# Patient Record
Sex: FEMALE | URBAN - METROPOLITAN AREA
[De-identification: names, ages, dates, MRNs, and addresses within clinical notes are randomized per-mention and may not be internally consistent; named-entity substitution may affect disease eponyms.]

---

## 2020-07-22 ENCOUNTER — APPOINTMENT (OUTPATIENT)
Age: 60
Setting detail: DERMATOLOGY
End: 2020-07-22

## 2020-07-22 DIAGNOSIS — L82.1 OTHER SEBORRHEIC KERATOSIS: ICD-10-CM

## 2020-07-22 DIAGNOSIS — D22 MELANOCYTIC NEVI: ICD-10-CM

## 2020-07-22 DIAGNOSIS — L81.4 OTHER MELANIN HYPERPIGMENTATION: ICD-10-CM

## 2020-07-22 PROBLEM — D22.9 MELANOCYTIC NEVI, UNSPECIFIED: Status: ACTIVE | Noted: 2020-07-22

## 2020-07-22 PROCEDURE — ? LIQUID NITROGEN

## 2020-07-22 PROCEDURE — ? COUNSELING

## 2020-07-22 PROCEDURE — ? PHOTO-DOCUMENTATION

## 2020-07-22 ASSESSMENT — LOCATION SIMPLE DESCRIPTION DERM
LOCATION SIMPLE: RIGHT THIGH
LOCATION SIMPLE: LEFT CALF
LOCATION SIMPLE: ABDOMEN

## 2020-07-22 ASSESSMENT — LOCATION DETAILED DESCRIPTION DERM
LOCATION DETAILED: LEFT PROXIMAL CALF
LOCATION DETAILED: RIGHT RIB CAGE
LOCATION DETAILED: RIGHT ANTERIOR PROXIMAL THIGH

## 2020-07-22 ASSESSMENT — LOCATION ZONE DERM
LOCATION ZONE: TRUNK
LOCATION ZONE: LEG

## 2021-01-19 ENCOUNTER — APPOINTMENT (RX ONLY)
Dept: URBAN - METROPOLITAN AREA CLINIC 55 | Facility: CLINIC | Age: 61
Setting detail: DERMATOLOGY
End: 2021-01-19

## 2021-01-19 ENCOUNTER — RX ONLY (OUTPATIENT)
Age: 61
Setting detail: RX ONLY
End: 2021-01-19

## 2021-01-19 DIAGNOSIS — Z41.9 ENCOUNTER FOR PROCEDURE FOR PURPOSES OTHER THAN REMEDYING HEALTH STATE, UNSPECIFIED: ICD-10-CM

## 2021-01-19 PROCEDURE — ? BOTOX

## 2021-01-19 RX ORDER — ALPRAZOLAM 1 MG/1
TABLET ORAL
Qty: 1 | Refills: 0 | Status: ERX

## 2021-01-19 RX ORDER — HYDROCODONE BITARTRATE AND ACETAMINOPHEN 5; 325 MG/1; MG/1
TABLET ORAL
Qty: 2 | Refills: 0 | Status: ERX

## 2021-01-19 NOTE — PROCEDURE: BOTOX
Left Pupillary Line Units: 0
Show Right And Left Brow Units: No
Show Depressor Anguli Units: Yes
Dilution (U/0.1 Cc): 4
Periorbital Skin Units: 16
Detail Level: Zone
Post-Care Instructions: After care instructions were provided to the patient.
Expiration Date (Month Year): 10/31/2023
Depressor Anguli Oris Units: 6
Consent: Verbal consent was obtained.
Lot #: [id number]
Glabellar Complex Units: 12

## 2021-02-16 ENCOUNTER — APPOINTMENT (RX ONLY)
Dept: URBAN - METROPOLITAN AREA CLINIC 55 | Facility: CLINIC | Age: 61
Setting detail: DERMATOLOGY
End: 2021-02-16

## 2021-02-16 DIAGNOSIS — Z41.9 ENCOUNTER FOR PROCEDURE FOR PURPOSES OTHER THAN REMEDYING HEALTH STATE, UNSPECIFIED: ICD-10-CM

## 2021-02-16 PROCEDURE — ? FRAXEL

## 2021-02-16 NOTE — PROCEDURE: FRAXEL
Location: decolletage of the chest
Number Of Passes: 8
Topical Anesthesia Type: 20% benzocaine, 8% lidocaine, 4% tetracaine
Number Of Passes: 1
Detail Level: Zone
Treatment Level: 4
Consent: Written consent obtained, risks reviewed including but not limited to pain and incomplete improvement.
Location: neck
Energy(Mj/Cm2): 15
External Cooling Fan Speed: 5
Add Post-Care Below To The Note: No
Wavelength: 1550nm
Anesthesia Type: 1% lidocaine with epinephrine
Energy(Mj/Cm2): 10
Price (Use Numbers Only, No Special Characters Or $): 1800
Wavelength: 1927nm
Indication: resurfacing
Post-Care Instructions: I reviewed with the patient in detail post-care instructions. Patient should avoid sun until area fully healed. [facility] post care handout given to patient. Patient will arrange transportation to home post procedure if oral medications are taken prior to procedure.

## 2021-02-22 ENCOUNTER — APPOINTMENT (RX ONLY)
Dept: URBAN - METROPOLITAN AREA CLINIC 55 | Facility: CLINIC | Age: 61
Setting detail: DERMATOLOGY
End: 2021-02-22

## 2021-02-22 DIAGNOSIS — Z41.9 ENCOUNTER FOR PROCEDURE FOR PURPOSES OTHER THAN REMEDYING HEALTH STATE, UNSPECIFIED: ICD-10-CM

## 2021-02-22 PROCEDURE — ? COSMETIC FOLLOW-UP

## 2021-02-22 NOTE — PROCEDURE: COSMETIC FOLLOW-UP
Detail Level: Zone
Comments (Free Text): Patient felt that she “didn’t” get a treatment as her recovery wasn’t as intense as previous recoveries from fraxel. Review settings and expectations from the settings that the patient was treated. Discussed that the face and neck will not have the same response as the face, 1550 treatment will not have presentation as the 1927 treatment. \\nReviewed information with dr. Vila. Pictures taken and recommended follow up in 4 wks.

## 2021-03-17 ENCOUNTER — APPOINTMENT (RX ONLY)
Dept: URBAN - METROPOLITAN AREA CLINIC 55 | Facility: CLINIC | Age: 61
Setting detail: DERMATOLOGY
End: 2021-03-17

## 2021-03-17 DIAGNOSIS — Z41.9 ENCOUNTER FOR PROCEDURE FOR PURPOSES OTHER THAN REMEDYING HEALTH STATE, UNSPECIFIED: ICD-10-CM

## 2021-03-17 PROCEDURE — ? COSMETIC CONSULTATION - RED SPOTS

## 2021-03-18 ENCOUNTER — RX ONLY (OUTPATIENT)
Age: 61
Setting detail: RX ONLY
End: 2021-03-18

## 2021-03-18 ENCOUNTER — APPOINTMENT (RX ONLY)
Dept: URBAN - METROPOLITAN AREA CLINIC 55 | Facility: CLINIC | Age: 61
Setting detail: DERMATOLOGY
End: 2021-03-18

## 2021-03-18 DIAGNOSIS — Z41.9 ENCOUNTER FOR PROCEDURE FOR PURPOSES OTHER THAN REMEDYING HEALTH STATE, UNSPECIFIED: ICD-10-CM

## 2021-03-18 PROCEDURE — ? FILLERS

## 2021-03-18 PROCEDURE — ? DYSPORT

## 2021-03-18 RX ORDER — HYDROCODONE BITARTRATE AND ACETAMINOPHEN 5; 325 MG/1; MG/1
TABLET ORAL
Qty: 2 | Refills: 0 | Status: ERX | COMMUNITY
Start: 2021-03-18

## 2021-03-18 RX ORDER — ALPRAZOLAM 1 MG/1
TABLET ORAL
Qty: 1 | Refills: 0 | Status: ERX | COMMUNITY
Start: 2021-03-18

## 2021-03-18 RX ORDER — HYDROCODONE BITARTRATE AND ACETAMINOPHEN 5; 325 MG/1; MG/1
TABLET ORAL
Qty: 2 | Refills: 0 | Status: CANCELLED

## 2021-03-18 RX ORDER — ALPRAZOLAM 1 MG/1
TABLET ORAL
Qty: 1 | Refills: 0 | Status: CANCELLED

## 2021-03-18 NOTE — PROCEDURE: DYSPORT
Show Levator Superior Units: Yes
R Brow Units: 0
Dilution (U/0.1 Cc): 10
Consent: Verbal consent was obtained.
Show Right And Left Pupillary Line Units: No
Lateral Platysmal Bands Units: 50
Detail Level: Simple
Lot #: [id number]
Post-Care Instructions: After care instructions were provided to the patient.
Expiration Date (Month Year): 10/31/21

## 2021-03-18 NOTE — PROCEDURE: FILLERS
Additional Area 5 Volume In Cc: 0
Expiration Date (Month Year): 06/30/22
Include Cannula Information In Note?: No
Anesthesia Type: 1% lidocaine with epinephrine
Map Statment: See Attach Map for Complete Details
Consent: Verbal consent was obtained.
Filler: Restylane Kysse
Include Cannula Size?: 27G
Include Cannula Information In Note?: Yes
Post-Care Instructions: After care instructions were provided to the patient.
Detail Level: Simple
Anesthesia Volume In Cc: 0.5
Lot #: [id number]

## 2021-04-13 ENCOUNTER — RX ONLY (OUTPATIENT)
Age: 61
Setting detail: RX ONLY
End: 2021-04-13

## 2021-04-22 ENCOUNTER — RX ONLY (OUTPATIENT)
Age: 61
Setting detail: RX ONLY
End: 2021-04-22

## 2021-04-22 ENCOUNTER — APPOINTMENT (RX ONLY)
Dept: URBAN - METROPOLITAN AREA CLINIC 55 | Facility: CLINIC | Age: 61
Setting detail: DERMATOLOGY
End: 2021-04-22

## 2021-04-22 DIAGNOSIS — Z41.9 ENCOUNTER FOR PROCEDURE FOR PURPOSES OTHER THAN REMEDYING HEALTH STATE, UNSPECIFIED: ICD-10-CM

## 2021-04-22 PROCEDURE — ? FRAXEL

## 2021-04-22 RX ORDER — HYDROCODONE BITARTRATE AND ACETAMINOPHEN 5; 325 MG/1; MG/1
TABLET ORAL
Qty: 2 | Refills: 0 | Status: ERX

## 2021-04-22 RX ORDER — ALPRAZOLAM 1 MG/1
TABLET ORAL
Qty: 1 | Refills: 0 | Status: ERX

## 2021-04-22 NOTE — PROCEDURE: FRAXEL
Energy(Mj/Cm2): 15
Energy(Mj/Cm2): 1
Anesthesia Type: 1% lidocaine with epinephrine
Total Coverage: 35%
Treatment Number: 2
Total Energy In Kj (Optional- Don't Include Units): 2.34
Energy(Mj/Cm2): 20
Wavelength: 1927nm
Depth In Microns (Use Numbers Only, No Special Characters Or $): 202
Treatment Level: 7
Pre-Procedure Text (Will Appear After Anesthesia Text): Photos obtained. BLT removed with dry gauze. Face was cleansed with a gentle cleanser and then prepped with chlorhexidine gluconate.
Detail Level: Zone
Treatment Level: 4
Length Of Topical Anesthesia Application (Optional): 60 minutes
Location: decolletage of the chest
Post-Care Instructions: I reviewed with the patient in detail post-care instructions. Patient should avoid sun until area fully healed. Samples of Epidermal Repair, Journee and Vaseline sent home with patient.
Number Of Passes: 8
Location: full face
External Cooling Fan Speed: 5
Topical Anesthesia Type: 20% benzocaine, 8% lidocaine, 4% tetracaine
Total Energy In Kj (Optional- Don't Include Units): 1.03
Indication: dyschromia
Consent: Verbal consent obtained, risks reviewed including but not limited to pain and incomplete improvement.
Add Post-Care Below To The Note: Yes
Location: neck

## 2021-05-25 ENCOUNTER — APPOINTMENT (RX ONLY)
Dept: URBAN - METROPOLITAN AREA CLINIC 55 | Facility: CLINIC | Age: 61
Setting detail: DERMATOLOGY
End: 2021-05-25

## 2021-05-25 DIAGNOSIS — Z41.9 ENCOUNTER FOR PROCEDURE FOR PURPOSES OTHER THAN REMEDYING HEALTH STATE, UNSPECIFIED: ICD-10-CM

## 2021-05-25 PROCEDURE — ? ICON IPL

## 2021-05-25 ASSESSMENT — LOCATION ZONE DERM
LOCATION ZONE: TRUNK
LOCATION ZONE: NECK

## 2021-05-25 ASSESSMENT — LOCATION DETAILED DESCRIPTION DERM
LOCATION DETAILED: LEFT INFERIOR ANTERIOR NECK
LOCATION DETAILED: MIDDLE STERNUM

## 2021-05-25 ASSESSMENT — LOCATION SIMPLE DESCRIPTION DERM
LOCATION SIMPLE: CHEST
LOCATION SIMPLE: LEFT ANTERIOR NECK

## 2021-05-25 NOTE — PROCEDURE: ICON IPL
Energy (Optional- Include Units): 36 J/cm2 / 20 ms
Length Of Topical Anesthesia Application (Optional): 60 minutes
Post-Care Instructions: I reviewed with the patient in detail post-care instructions. Patient should stay away from the sun and wear sun protection until treated areas are fully healed.
Pre-Procedure Text: After consent was obtained, the treatment areas were cleansed and treated using the parameters mentioned above.
External Cooling Fan Speed: 5
Treatment Number (Optional): 1
Detail Level: Zone
Consent: Verbal consent obtained, risks reviewed including but not limited to crusting, scabbing, blistering, scarring, darker or lighter pigmentary change, bruising, and/or incomplete response.
Contact: Light
Topical Anesthesia?: 20% benzocaine, 8% lidocaine, 4% tetracaine
Anesthesia Type: 1% lidocaine with epinephrine
Anesthesia Volume In Cc: 0
Consent: Written consent obtained, risks reviewed including but not limited to crusting, scabbing, blistering, scarring, darker or lighter pigmentary change, bruising, and/or incomplete response.

## 2021-05-28 ENCOUNTER — RX ONLY (OUTPATIENT)
Age: 61
Setting detail: RX ONLY
End: 2021-05-28

## 2021-05-28 RX ORDER — VALACYCLOVIR 500 MG/1
TABLET, FILM COATED ORAL
Qty: 10 | Refills: 3 | Status: ERX | COMMUNITY
Start: 2021-05-28

## 2021-06-02 ENCOUNTER — RX ONLY (OUTPATIENT)
Age: 61
Setting detail: RX ONLY
End: 2021-06-02

## 2021-06-02 RX ORDER — ALPRAZOLAM 1 MG/1
TABLET ORAL
Qty: 1 | Refills: 0 | Status: ERX

## 2021-06-02 RX ORDER — HYDROCODONE BITARTRATE AND ACETAMINOPHEN 5; 325 MG/1; MG/1
TABLET ORAL
Qty: 2 | Refills: 0 | Status: ERX

## 2021-06-09 ENCOUNTER — APPOINTMENT (RX ONLY)
Dept: URBAN - METROPOLITAN AREA CLINIC 55 | Facility: CLINIC | Age: 61
Setting detail: DERMATOLOGY
End: 2021-06-09

## 2021-06-09 DIAGNOSIS — Z41.9 ENCOUNTER FOR PROCEDURE FOR PURPOSES OTHER THAN REMEDYING HEALTH STATE, UNSPECIFIED: ICD-10-CM

## 2021-06-09 PROCEDURE — ? COSMETIC CONSULTATION: PRODUCTS

## 2021-06-09 PROCEDURE — ? FILLERS

## 2021-06-09 PROCEDURE — ? LIQUID NITROGEN (COSMETIC)

## 2021-06-09 PROCEDURE — ? BOTOX

## 2021-06-09 ASSESSMENT — LOCATION DETAILED DESCRIPTION DERM: LOCATION DETAILED: NASAL DORSUM

## 2021-06-09 ASSESSMENT — LOCATION SIMPLE DESCRIPTION DERM: LOCATION SIMPLE: NOSE

## 2021-06-09 ASSESSMENT — LOCATION ZONE DERM: LOCATION ZONE: NOSE

## 2021-06-09 NOTE — PROCEDURE: LIQUID NITROGEN (COSMETIC)
Duration Of Freeze Thaw-Cycle (Seconds): 0
Render Post Care In The Note?: yes
Total Number Of Lesions Treated: 1
Detail Level: Zone
Post-care instructions were reviewed. Patient is to wear sun protection and avoid picking at any of the treated lesions. Pt may apply Vaseline to crusted or scabbing areas.

## 2021-06-09 NOTE — PROCEDURE: FILLERS
Marionette Lines Filler Volume In Cc: 0
Consent: Written consent was obtained.
Include Cannula Information In Note?: Yes
Filler: RHA 4
Anesthesia Type: 1% lidocaine with epinephrine
Post-Care Instructions: After care instructions were provided to the patient.
Include Cannula Information In Note?: No
Lot #: [id number]
Lot #: [id number]
Lot #: [id number]
Expiration Date (Month Year): 08/31/22
Expiration Date (Month Year): 8/24/23
Expiration Date (Month Year): 08/21/23
Map Statment: See Attach Map for Complete Details
Filler: RHA 3
Lot #: [id number]
Filler: Restylane Kysse
Include Cannula Size?: 27G
Filler: RHA 2
Detail Level: Simple
Anesthesia Volume In Cc: 0.5

## 2021-06-09 NOTE — PROCEDURE: BOTOX
Show Nasal Units: Yes
Additional Area 6 Units: 0
Glabellar Complex Units: 12
Show Right And Left Periorbital Units: No
Depressor Anguli Oris Units: 6
Consent: Written consent was obtained.
Detail Level: Zone
Forehead Units: 4
Post-Care Instructions: After care instructions were provided to the patient.
Anterior Platysmal Bands Units: 20
Lot #: [id number]
Periorbital Skin Units: 16
Expiration Date (Month Year): 09/30/23

## 2021-06-15 ENCOUNTER — APPOINTMENT (RX ONLY)
Dept: URBAN - METROPOLITAN AREA CLINIC 55 | Facility: CLINIC | Age: 61
Setting detail: DERMATOLOGY
End: 2021-06-15

## 2021-06-15 DIAGNOSIS — Z41.9 ENCOUNTER FOR PROCEDURE FOR PURPOSES OTHER THAN REMEDYING HEALTH STATE, UNSPECIFIED: ICD-10-CM

## 2021-06-15 PROCEDURE — ? ICON IPL

## 2021-06-15 ASSESSMENT — LOCATION DETAILED DESCRIPTION DERM
LOCATION DETAILED: RIGHT INFERIOR ANTERIOR NECK
LOCATION DETAILED: MIDDLE STERNUM

## 2021-06-15 ASSESSMENT — LOCATION ZONE DERM
LOCATION ZONE: NECK
LOCATION ZONE: TRUNK

## 2021-06-15 ASSESSMENT — LOCATION SIMPLE DESCRIPTION DERM
LOCATION SIMPLE: CHEST
LOCATION SIMPLE: RIGHT ANTERIOR NECK

## 2021-06-15 NOTE — PROCEDURE: ICON IPL
Topical Anesthesia?: 20% benzocaine, 8% lidocaine, 4% tetracaine
Length Of Topical Anesthesia Application (Optional): 60 minutes
Energy (Optional- Include Units): 38 J/cm2 / 20 ms
Passes: 1
Anesthesia Type: 1% lidocaine with epinephrine
Detail Level: Zone
Contact: Light
Consent: Verbal consent obtained, risks reviewed including but not limited to crusting, scabbing, blistering, scarring, darker or lighter pigmentary change, bruising, and/or incomplete response.
Treatment Number (Optional): 2
External Cooling Fan Speed: 5
Post-Care Instructions: I reviewed with the patient in detail post-care instructions. Patient should stay away from the sun and wear sun protection until treated areas are fully healed.
Anesthesia Volume In Cc: 0
Pre-Procedure Text: After consent was obtained, the treatment areas were cleansed and treated using the parameters mentioned above.

## 2021-07-06 ENCOUNTER — APPOINTMENT (RX ONLY)
Dept: URBAN - METROPOLITAN AREA CLINIC 55 | Facility: CLINIC | Age: 61
Setting detail: DERMATOLOGY
End: 2021-07-06

## 2021-07-06 DIAGNOSIS — Z41.9 ENCOUNTER FOR PROCEDURE FOR PURPOSES OTHER THAN REMEDYING HEALTH STATE, UNSPECIFIED: ICD-10-CM

## 2021-07-06 PROCEDURE — ? FILLERS

## 2021-07-06 PROCEDURE — ? BOTOX

## 2021-07-06 NOTE — PROCEDURE: FILLERS
Lateral Face Filler Volume In Cc: 0
Map Statment: See Attach Map for Complete Details
Include Cannula Information In Note?: No
Include Cannula Information In Note?: Yes
Include Cannula Size?: 27G
Anesthesia Volume In Cc: 0.5
Post-Care Instructions: After care instructions were provided to the patient.
Anesthesia Type: 1% lidocaine with epinephrine
Lot #: [id number]
Filler: Sam Bailon
Consent: Written consent was obtained.
Expiration Date (Month Year): 12/31/2023
Detail Level: Simple

## 2021-07-06 NOTE — PROCEDURE: BOTOX
Levator Labii Superioris Units: 0
Dilution (U/0.1 Cc): 4
Show Depressor Anguli Units: Yes
Show Mentalis Units: No
Forehead Units: 5
Lot #: [id number]
Post-Care Instructions: After care instructions were provided to the patient.
Detail Level: Zone
Consent: Written consent was obtained.
Expiration Date (Month Year): 10/31/2023

## 2021-09-20 ENCOUNTER — APPOINTMENT (OUTPATIENT)
Age: 61
Setting detail: DERMATOLOGY
End: 2021-09-20

## 2021-09-20 DIAGNOSIS — L82.1 OTHER SEBORRHEIC KERATOSIS: ICD-10-CM

## 2021-09-20 DIAGNOSIS — D22 MELANOCYTIC NEVI: ICD-10-CM

## 2021-09-20 DIAGNOSIS — L81.4 OTHER MELANIN HYPERPIGMENTATION: ICD-10-CM

## 2021-09-20 PROBLEM — D22.9 MELANOCYTIC NEVI, UNSPECIFIED: Status: ACTIVE | Noted: 2021-09-20

## 2021-09-20 PROCEDURE — ? LIQUID NITROGEN

## 2021-09-20 PROCEDURE — ? MEDICAL PHOTOGRAPHY REVIEW

## 2021-09-20 PROCEDURE — ? COUNSELING

## 2021-09-20 ASSESSMENT — LOCATION SIMPLE DESCRIPTION DERM
LOCATION SIMPLE: LEFT POSTERIOR UPPER ARM
LOCATION SIMPLE: RIGHT POSTERIOR UPPER ARM
LOCATION SIMPLE: RIGHT NOSE
LOCATION SIMPLE: NOSE
LOCATION SIMPLE: LEFT EYEBROW

## 2021-09-20 ASSESSMENT — LOCATION ZONE DERM
LOCATION ZONE: ARM
LOCATION ZONE: FACE
LOCATION ZONE: NOSE

## 2021-09-20 ASSESSMENT — LOCATION DETAILED DESCRIPTION DERM
LOCATION DETAILED: LEFT CENTRAL EYEBROW
LOCATION DETAILED: LEFT PROXIMAL POSTERIOR UPPER ARM
LOCATION DETAILED: RIGHT NASAL SIDEWALL
LOCATION DETAILED: RIGHT PROXIMAL POSTERIOR UPPER ARM
LOCATION DETAILED: NASAL ROOT

## 2021-11-19 ENCOUNTER — APPOINTMENT (RX ONLY)
Dept: URBAN - METROPOLITAN AREA CLINIC 55 | Facility: CLINIC | Age: 61
Setting detail: DERMATOLOGY
End: 2021-11-19

## 2021-11-19 DIAGNOSIS — Z41.9 ENCOUNTER FOR PROCEDURE FOR PURPOSES OTHER THAN REMEDYING HEALTH STATE, UNSPECIFIED: ICD-10-CM

## 2021-11-19 PROCEDURE — ? FILLERS

## 2021-11-19 PROCEDURE — ? BOTOX

## 2021-11-19 NOTE — PROCEDURE: FILLERS
Additional Area 5 Volume In Cc: 0
Detail Level: Simple
Anesthesia Volume In Cc: 0.5
Include Cannula Information In Note?: Yes
Include Cannula Information In Note?: No
Expiration Date (Month Year): 11/22
Anesthesia Type: 1% lidocaine with epinephrine
Post-Care Instructions: After care instructions were provided to the patient.
Include Cannula Size?: 25G
Include Cannula Size?: 27G
Lot #: [id number]
Filler: Restylane Kysse
Map Statment: See Attach Map for Complete Details
Consent: Written consent was obtained.

## 2021-11-19 NOTE — PROCEDURE: BOTOX
Lateral Platysmal Bands Units: 0
Show Depressor Anguli Units: Yes
Consent: Written consent was obtained.
Show Right And Left Brow Units: No
Lot #: [id number]
Detail Level: Zone
Post-Care Instructions: After care instructions were provided to the patient.
Periorbital Skin Units: 16
Dilution (U/0.1 Cc): 4
Depressor Anguli Oris Units: 6
Glabellar Complex Units: 12
Expiration Date (Month Year): 09/30/23

## 2022-01-12 ENCOUNTER — RX ONLY (RX ONLY)
Age: 62
End: 2022-01-12

## 2022-01-12 RX ORDER — MUPIROCIN 20 MG/G
OINTMENT TOPICAL
Qty: 22 | Refills: 2 | Status: ERX | COMMUNITY
Start: 2022-01-12

## 2022-01-12 RX ORDER — CEPHALEXIN 500 MG/1
CAPSULE ORAL
Qty: 21 | Refills: 0 | Status: ERX | COMMUNITY
Start: 2022-01-12

## 2022-04-14 ENCOUNTER — APPOINTMENT (RX ONLY)
Dept: URBAN - METROPOLITAN AREA CLINIC 55 | Facility: CLINIC | Age: 62
Setting detail: DERMATOLOGY
End: 2022-04-14

## 2022-04-14 DIAGNOSIS — Z41.9 ENCOUNTER FOR PROCEDURE FOR PURPOSES OTHER THAN REMEDYING HEALTH STATE, UNSPECIFIED: ICD-10-CM

## 2022-04-14 PROCEDURE — ? BOTOX

## 2022-04-14 PROCEDURE — ? FILLERS

## 2022-04-14 NOTE — PROCEDURE: BOTOX
Show Periorbital Units: Yes
Forehead Units: 4
Left Periorbital Skin Units: 0
Show Mentalis Units: No
Glabellar Complex Units: 12
Consent: Written consent was obtained.
Periorbital Skin Units: 16
Lot #: [id number]
Post-Care Instructions: After care instructions were provided to the patient.
Detail Level: Zone
Expiration Date (Month Year): 09/30/23
Depressor Anguli Oris Units: 6

## 2022-04-14 NOTE — PROCEDURE: FILLERS
Marionette Lines Filler Volume In Cc: 0
Filler: Restylane Kysse
Include Cannula Information In Note?: No
Include Cannula Size?: 27G
Lot #: [id number]
Anesthesia Type: 1% lidocaine with epinephrine
Anesthesia Volume In Cc: 0.5
Include Cannula Information In Note?: Yes
Post-Care Instructions: After care instructions were provided to the patient.
Detail Level: Simple
Consent: Written consent was obtained.
Map Statment: See Attach Map for Complete Details
Expiration Date (Month Year): 11/2023

## 2022-08-11 ENCOUNTER — APPOINTMENT (RX ONLY)
Dept: URBAN - METROPOLITAN AREA CLINIC 55 | Facility: CLINIC | Age: 62
Setting detail: DERMATOLOGY
End: 2022-08-11

## 2022-08-11 ENCOUNTER — APPOINTMENT (OUTPATIENT)
Age: 62
Setting detail: DERMATOLOGY
End: 2022-08-11

## 2022-08-11 DIAGNOSIS — B07.8 OTHER VIRAL WARTS: ICD-10-CM

## 2022-08-11 DIAGNOSIS — L57.0 ACTINIC KERATOSIS: ICD-10-CM

## 2022-08-11 DIAGNOSIS — L72.8 OTHER FOLLICULAR CYSTS OF THE SKIN AND SUBCUTANEOUS TISSUE: ICD-10-CM

## 2022-08-11 DIAGNOSIS — Z41.9 ENCOUNTER FOR PROCEDURE FOR PURPOSES OTHER THAN REMEDYING HEALTH STATE, UNSPECIFIED: ICD-10-CM

## 2022-08-11 DIAGNOSIS — L82.1 OTHER SEBORRHEIC KERATOSIS: ICD-10-CM

## 2022-08-11 PROCEDURE — ? BOTOX

## 2022-08-11 PROCEDURE — ? LIQUID NITROGEN

## 2022-08-11 PROCEDURE — ? DEFER

## 2022-08-11 PROCEDURE — ? COUNSELING

## 2022-08-11 ASSESSMENT — LOCATION ZONE DERM
LOCATION ZONE: NOSE
LOCATION ZONE: FINGER
LOCATION ZONE: FACE
LOCATION ZONE: EYELID

## 2022-08-11 ASSESSMENT — LOCATION DETAILED DESCRIPTION DERM
LOCATION DETAILED: LEFT PROXIMAL DORSAL RING FINGER
LOCATION DETAILED: RIGHT MEDIAL CANTHUS
LOCATION DETAILED: NASAL ROOT
LOCATION DETAILED: LEFT SUPERIOR NASAL CHEEK
LOCATION DETAILED: RIGHT PROXIMAL RADIAL PALMAR MIDDLE FINGER

## 2022-08-11 ASSESSMENT — LOCATION SIMPLE DESCRIPTION DERM
LOCATION SIMPLE: RIGHT MIDDLE FINGER
LOCATION SIMPLE: LEFT RING FINGER
LOCATION SIMPLE: RIGHT EYELID
LOCATION SIMPLE: LEFT CHEEK
LOCATION SIMPLE: NOSE

## 2022-08-11 NOTE — PROCEDURE: BOTOX
Show Lateral Platysmal Band Units: Yes
Inferior Lateral Orbicularis Oculi Units: 0
Detail Level: Detailed
Depressor Anguli Oris Units: 6
Show Inventory Tab: Show
Show Ucl Units: No
Consent was obtained.
Post-Care Instructions: Patient instructed to not lie down for 4 hours and limit physical activity for 24 hours.
Periorbital Skin Units: 16
Dilution (U/0.1 Cc): 4
Glabellar Complex Units: 12

## 2022-10-24 ENCOUNTER — APPOINTMENT (OUTPATIENT)
Age: 62
Setting detail: DERMATOLOGY
End: 2022-10-24

## 2022-10-24 DIAGNOSIS — L72.8 OTHER FOLLICULAR CYSTS OF THE SKIN AND SUBCUTANEOUS TISSUE: ICD-10-CM

## 2022-10-24 DIAGNOSIS — L82.0 INFLAMED SEBORRHEIC KERATOSIS: ICD-10-CM

## 2022-10-24 DIAGNOSIS — B07.8 OTHER VIRAL WARTS: ICD-10-CM

## 2022-10-24 DIAGNOSIS — D22 MELANOCYTIC NEVI: ICD-10-CM

## 2022-10-24 PROBLEM — D48.5 NEOPLASM OF UNCERTAIN BEHAVIOR OF SKIN: Status: ACTIVE | Noted: 2022-10-24

## 2022-10-24 PROBLEM — D22.9 MELANOCYTIC NEVI, UNSPECIFIED: Status: ACTIVE | Noted: 2022-10-24

## 2022-10-24 PROCEDURE — ? COUNSELING

## 2022-10-24 PROCEDURE — ? SUNSCREEN RECOMMENDATIONS

## 2022-10-24 PROCEDURE — ? MEDICAL PHOTOGRAPHY REVIEW

## 2022-10-24 PROCEDURE — ? BIOPSY BY PUNCH METHOD

## 2022-10-24 PROCEDURE — ? LIQUID NITROGEN

## 2022-10-24 ASSESSMENT — LOCATION ZONE DERM
LOCATION ZONE: FINGER
LOCATION ZONE: LEG

## 2022-10-24 ASSESSMENT — LOCATION SIMPLE DESCRIPTION DERM
LOCATION SIMPLE: RIGHT POSTERIOR THIGH
LOCATION SIMPLE: RIGHT INDEX FINGER
LOCATION SIMPLE: LEFT RING FINGER

## 2022-10-24 ASSESSMENT — LOCATION DETAILED DESCRIPTION DERM
LOCATION DETAILED: RIGHT PROXIMAL POSTERIOR THIGH
LOCATION DETAILED: LEFT PROXIMAL DORSAL RING FINGER
LOCATION DETAILED: RIGHT PROXIMAL PALMAR INDEX FINGER

## 2022-11-16 ENCOUNTER — APPOINTMENT (RX ONLY)
Dept: URBAN - METROPOLITAN AREA CLINIC 55 | Facility: CLINIC | Age: 62
Setting detail: DERMATOLOGY
End: 2022-11-16

## 2022-11-16 DIAGNOSIS — Z41.9 ENCOUNTER FOR PROCEDURE FOR PURPOSES OTHER THAN REMEDYING HEALTH STATE, UNSPECIFIED: ICD-10-CM

## 2022-11-16 PROCEDURE — ? INVENTORY

## 2022-11-16 PROCEDURE — ? BOTOX

## 2022-11-16 PROCEDURE — ? FILLERS

## 2022-11-16 NOTE — PROCEDURE: FILLERS
Nasolabial Folds Filler Volume In Cc: 0
Include Cannula Information In Note?: Yes
Use Map Statement For Sites (Optional): No
Filler: RHA 3
Include Cannula Size?: 27G
Map Statment: See Attach Map for Complete Details
Consent was obtained.
Post-Care Instructions: After care instructions were provided verbally and in writing.
Filler: RHA 4
Anesthesia Type: 1% lidocaine with epinephrine
Anesthesia Volume In Cc: 0.5
Detail Level: Detailed

## 2022-11-16 NOTE — PROCEDURE: BOTOX
Show Right And Left Periorbital Units: No
Post-Care Instructions: Patient instructed to not lie down for 4 hours and limit physical activity for 24 hours.
Show Masseter Units: Yes
Forehead Units: 4
Consent was obtained.
Lcl Root Units: 0
Detail Level: Detailed
Glabellar Complex Units: 12
Depressor Anguli Oris Units: 6
Show Inventory Tab: Show
Periorbital Skin Units: 16

## 2023-11-10 ENCOUNTER — RX ONLY (OUTPATIENT)
Age: 63
Setting detail: RX ONLY
End: 2023-11-10

## 2023-11-10 ENCOUNTER — APPOINTMENT (RX ONLY)
Dept: URBAN - METROPOLITAN AREA CLINIC 55 | Facility: CLINIC | Age: 63
Setting detail: DERMATOLOGY
End: 2023-11-10

## 2023-11-10 DIAGNOSIS — Z41.9 ENCOUNTER FOR PROCEDURE FOR PURPOSES OTHER THAN REMEDYING HEALTH STATE, UNSPECIFIED: ICD-10-CM

## 2023-11-10 PROCEDURE — ? INVENTORY

## 2023-11-10 PROCEDURE — ? IN-HOUSE DISPENSING PHARMACY

## 2023-11-10 RX ORDER — VALACYCLOVIR 500 MG/1
TABLET, FILM COATED ORAL
Qty: 10 | Refills: 3 | Status: ERX | COMMUNITY
Start: 2023-11-10

## 2023-11-10 RX ORDER — ALPRAZOLAM 1 MG/1
TABLET ORAL
Qty: 1 | Refills: 0 | Status: ERX | COMMUNITY
Start: 2023-11-10

## 2023-11-10 RX ORDER — HYDROCODONE BITARTRATE AND ACETAMINOPHEN 5; 325 MG/1; MG/1
TABLET ORAL
Qty: 2 | Refills: 0 | Status: ERX | COMMUNITY
Start: 2023-11-10

## 2023-11-10 NOTE — PROCEDURE: IN-HOUSE DISPENSING PHARMACY
Product 59 Amount/Unit (Numbers Only): 0
Product 7 Application Directions: Apply only as directed
Product 68 Unit Type: mg
Product 6 Application Directions: Apply nightly or as directed. Use SPF daily.
Product 7 Amount/Unit (Numbers Only): 30
Product 7 Unit Type: ml
Name Of Product 6: Rosacea Triple Combination Gel
Name Of Product 2: Dapsone / Spironolactone Gel
Name Of Product 4: Hydroquinone 8% Emulsion
Product 5 Refills: 11
Name Of Product 5: Hydrating Tretinoin 0.025% Cream
Product 4 Refills: 2
Send Charges To Patient Encounter: No
Name Of Product 7: Lidocaine 23% / Tetracaine 7% Cream
Detail Level: Zone
Render Product Pricing In Note: Yes
Name Of Product 1: Anti-Aging Brightening Cream
Product 4 Units Dispensed: 1
Product 2 Application Directions: Apply nightly or as directed.
Name Of Product 3: Acne Triple Combination Gel

## 2023-11-13 ENCOUNTER — APPOINTMENT (RX ONLY)
Dept: URBAN - METROPOLITAN AREA CLINIC 55 | Facility: CLINIC | Age: 63
Setting detail: DERMATOLOGY
End: 2023-11-13

## 2023-11-13 DIAGNOSIS — Z41.9 ENCOUNTER FOR PROCEDURE FOR PURPOSES OTHER THAN REMEDYING HEALTH STATE, UNSPECIFIED: ICD-10-CM

## 2023-11-13 PROCEDURE — ? IN-HOUSE DISPENSING PHARMACY

## 2023-11-13 PROCEDURE — ? INVENTORY

## 2023-11-13 NOTE — PROCEDURE: IN-HOUSE DISPENSING PHARMACY
Name Of Product 4: Hydroquinone 8% Emulsion
Product 5 Price/Unit (In Dollars): 0
Product 19 Unit Type: mg
Product 1 Unit Type: ml
Product 7 Application Directions: Apply only as directed
Render Refills If Set To 0: Yes
Name Of Product 6: Rosacea Triple Combination Gel
Product 1 Amount/Unit (Numbers Only): 30
Product 7 Refills: 2
Name Of Product 5: Hydrating Tretinoin 0.025% Cream
Product 6 Application Directions: Apply nightly or as directed. Use SPF daily.
Product 7 Units Dispensed: 1
Name Of Product 7: Lidocaine 23% / Tetracaine 7% Cream
Product 3 Refills: 11
Send Charges To Patient Encounter: No
Product 4 Application Directions: Apply nightly or as directed.
Name Of Product 1: Anti-Aging Brightening Cream
Name Of Product 3: Acne Triple Combination Gel
Detail Level: Zone
Name Of Product 2: Dapsone / Spironolactone Gel

## 2023-11-16 ENCOUNTER — APPOINTMENT (RX ONLY)
Dept: URBAN - METROPOLITAN AREA CLINIC 55 | Facility: CLINIC | Age: 63
Setting detail: DERMATOLOGY
End: 2023-11-16

## 2023-11-16 DIAGNOSIS — Z41.9 ENCOUNTER FOR PROCEDURE FOR PURPOSES OTHER THAN REMEDYING HEALTH STATE, UNSPECIFIED: ICD-10-CM

## 2023-11-16 PROCEDURE — ? INVENTORY

## 2023-11-16 PROCEDURE — ? FILLERS

## 2023-11-16 PROCEDURE — ? BOTOX

## 2023-11-16 NOTE — PROCEDURE: FILLERS
Post-Care Instructions: After care instructions were provided verbally and in writing.
Additional Anesthesia Volume In Cc: 0
Filler: RHA Redensity
Consent was obtained.
Filler: RHA 3
Include Cannula Information In Note?: No
Detail Level: Detailed
Map Statment: See Attach Map for Complete Details
Include Cannula Information In Note?: Yes
Include Cannula Size?: 27G
Anesthesia Type: 1% lidocaine with epinephrine
Anesthesia Volume In Cc: 0.5

## 2023-11-16 NOTE — PROCEDURE: BOTOX
Additional Area 3 Units: 0
Show Glabellar Units: Yes
Forehead Units: 4
Show Right And Left Pupillary Line Units: No
Glabellar Complex Units: 12
Depressor Anguli Oris Units: 6
Incrementing Botox Units: By 0.5 Units
Detail Level: Detailed
Consent was obtained.
Periorbital Skin Units: 16
Post-Care Instructions: Patient instructed to not lie down for 4 hours and limit physical activity for 24 hours.
Show Inventory Tab: Show

## 2023-12-06 ENCOUNTER — RX ONLY (OUTPATIENT)
Age: 63
Setting detail: RX ONLY
End: 2023-12-06

## 2023-12-06 RX ORDER — ALPRAZOLAM 1 MG/1
TABLET ORAL
Qty: 1 | Refills: 0 | Status: ERX

## 2023-12-06 RX ORDER — HYDROCODONE BITARTRATE AND ACETAMINOPHEN 5; 325 MG/1; MG/1
TABLET ORAL
Qty: 2 | Refills: 0 | Status: ERX

## 2024-02-19 ENCOUNTER — APPOINTMENT (OUTPATIENT)
Age: 64
Setting detail: DERMATOLOGY
End: 2024-02-19

## 2024-02-19 DIAGNOSIS — D22 MELANOCYTIC NEVI: ICD-10-CM

## 2024-02-19 DIAGNOSIS — L81.4 OTHER MELANIN HYPERPIGMENTATION: ICD-10-CM

## 2024-02-19 DIAGNOSIS — L73.9 FOLLICULAR DISORDER, UNSPECIFIED: ICD-10-CM

## 2024-02-19 DIAGNOSIS — L82.1 OTHER SEBORRHEIC KERATOSIS: ICD-10-CM

## 2024-02-19 PROBLEM — L02.821 FURUNCLE OF HEAD [ANY PART, EXCEPT FACE]: Status: ACTIVE | Noted: 2024-02-19

## 2024-02-19 PROBLEM — D22.5 MELANOCYTIC NEVI OF TRUNK: Status: ACTIVE | Noted: 2024-02-19

## 2024-02-19 PROCEDURE — ? LIQUID NITROGEN (COSMETIC)

## 2024-02-19 PROCEDURE — ? PRESCRIPTION

## 2024-02-19 PROCEDURE — ? PRESCRIPTION MEDICATION MANAGEMENT

## 2024-02-19 PROCEDURE — ? MEDICAL PHOTOGRAPHY REVIEW

## 2024-02-19 PROCEDURE — ? ADDITIONAL NOTES

## 2024-02-19 PROCEDURE — ? OBSERVATION

## 2024-02-19 PROCEDURE — ? COUNSELING

## 2024-02-19 PROCEDURE — ? PHOTO-DOCUMENTATION

## 2024-02-19 RX ORDER — CLINDAMYCIN PHOSPHATE 10 MG/ML
SOLUTION TOPICAL
Qty: 60 | Refills: 11 | Status: ERX | COMMUNITY
Start: 2024-02-19

## 2024-02-19 RX ADMIN — CLINDAMYCIN PHOSPHATE: 10 SOLUTION TOPICAL at 00:00

## 2024-02-19 ASSESSMENT — LOCATION ZONE DERM
LOCATION ZONE: ARM
LOCATION ZONE: TRUNK
LOCATION ZONE: SCALP

## 2024-02-19 ASSESSMENT — LOCATION DETAILED DESCRIPTION DERM
LOCATION DETAILED: RIGHT PROXIMAL POSTERIOR UPPER ARM
LOCATION DETAILED: LEFT CLAVICULAR SKIN
LOCATION DETAILED: EPIGASTRIC SKIN
LOCATION DETAILED: RIGHT MEDIAL SUPERIOR CHEST
LOCATION DETAILED: MIDDLE STERNUM
LOCATION DETAILED: LEFT LATERAL SUPERIOR CHEST
LOCATION DETAILED: LEFT PROXIMAL POSTERIOR UPPER ARM
LOCATION DETAILED: LEFT MEDIAL SUPERIOR CHEST
LOCATION DETAILED: LEFT MEDIAL FRONTAL SCALP
LOCATION DETAILED: LOWER STERNUM

## 2024-02-19 ASSESSMENT — LOCATION SIMPLE DESCRIPTION DERM
LOCATION SIMPLE: LEFT SCALP
LOCATION SIMPLE: RIGHT POSTERIOR UPPER ARM
LOCATION SIMPLE: LEFT CLAVICULAR SKIN
LOCATION SIMPLE: LEFT POSTERIOR UPPER ARM
LOCATION SIMPLE: CHEST
LOCATION SIMPLE: ABDOMEN

## 2024-09-13 ENCOUNTER — RX ONLY (OUTPATIENT)
Age: 64
Setting detail: RX ONLY
End: 2024-09-13

## 2024-09-13 ENCOUNTER — APPOINTMENT (RX ONLY)
Dept: URBAN - METROPOLITAN AREA CLINIC 55 | Facility: CLINIC | Age: 64
Setting detail: DERMATOLOGY
End: 2024-09-13

## 2024-09-13 DIAGNOSIS — Z41.9 ENCOUNTER FOR PROCEDURE FOR PURPOSES OTHER THAN REMEDYING HEALTH STATE, UNSPECIFIED: ICD-10-CM

## 2024-09-13 PROCEDURE — ? COSMETIC FOLLOW-UP

## 2024-09-13 PROCEDURE — ? INVENTORY

## 2024-09-13 RX ORDER — VALACYCLOVIR 500 MG/1
TABLET, FILM COATED ORAL
Qty: 10 | Refills: 0 | Status: ERX

## 2024-09-13 RX ORDER — ALPRAZOLAM 1 MG/1
TABLET ORAL
Qty: 1 | Refills: 0 | Status: ERX

## 2024-09-13 RX ORDER — HYDROCODONE BITARTRATE AND ACETAMINOPHEN 5; 325 MG/1; MG/1
TABLET ORAL
Qty: 2 | Refills: 0 | Status: ERX

## 2024-09-13 RX ORDER — HYDROQUINONE 4 %
CREAM (GRAM) TOPICAL
Qty: 30 | Refills: 0 | Status: ERX | COMMUNITY
Start: 2024-09-13

## 2024-10-18 ENCOUNTER — APPOINTMENT (RX ONLY)
Dept: URBAN - METROPOLITAN AREA CLINIC 55 | Facility: CLINIC | Age: 64
Setting detail: DERMATOLOGY
End: 2024-10-18

## 2024-10-18 DIAGNOSIS — Z41.9 ENCOUNTER FOR PROCEDURE FOR PURPOSES OTHER THAN REMEDYING HEALTH STATE, UNSPECIFIED: ICD-10-CM

## 2024-10-18 PROCEDURE — ? PRO-NOX

## 2024-10-18 PROCEDURE — ? INVENTORY

## 2024-10-18 PROCEDURE — ? FRAXEL

## 2024-10-18 NOTE — PROCEDURE: FRAXEL
Consent obtained, risks reviewed.
Depth In Microns (Use Numbers Only, No Special Characters Or $): 199
Indication: photodamage
Number Of Passes: 1
Treatment Level: 4
Location: neck
Add Post-Care Below To The Note: Yes
External Cooling Fan Speed: 5
Large Metal Eye Shield Text: The ocular mucosa was anesthetized with tetracaine. Once adequate anesthesia was optained, large metal eye shields were inserted and remained in place until the procedure was completed.
Total Coverage: 9%
Number Of Passes: 8
Energy(Mj/Cm2): 20
Location: full face
Length Of Topical Anesthesia Application (Optional): 60 minutes
Depth In Microns (Use Numbers Only, No Special Characters Or $): 420
Wavelength: 1927nm
Post-Care Instructions: Topical anesthetic removed with gauze. Skin cleansed with a gentle  and prepped with Hibiclens.\\nCO2 lift mask applied post treatment and advised to keep on for 1 hour. Ice packs sent home with patient. \\nI reviewed with the patient in detail post-care instructions. Patient should avoid sun until area fully healed.
Detail Level: Zone
Total Coverage: 30%
Medium Metal Eye Shield Text: The ocular mucosa was anesthetized with tetracaine. Once adequate anesthesia was optained, medium metal eye shields were inserted and remained in place until the procedure was completed.
Total Energy In Kj (Optional- Don't Include Units): 2.23
Large Plastic Eye Shield Text: The ocular mucosa was anesthetized with tetracaine. Once adequate anesthesia was optained, large plastic eye shields were inserted and remained in place until the procedure was completed.
Energy(Mj/Cm2): 15
Anesthesia Volume In Cc: 0.5
Treatment Level: 3
Topical Anesthesia Type: 20% benzocaine, 8% lidocaine, 4% tetracaine
Total Energy In Kj (Optional- Don't Include Units): 1.88
Small Metal Eye Shield Text: The ocular mucosa was anesthetized with tetracaine. Once adequate anesthesia was optained, small metal eye shields were inserted and remained in place until the procedure was completed.
Total Coverage: 35%
Medium Plastic Eye Shield Text: The ocular mucosa was anesthetized with tetracaine. Once adequate anesthesia was optained, medium plastic eye shields were inserted and remained in place until the procedure was completed.
Depth In Microns (Use Numbers Only, No Special Characters Or $): 202
Anesthesia Type: 1% lidocaine with epinephrine
Was An Eye Shield Used?: No
Small Plastic Eye Shield Text: The ocular mucosa was anesthetized with tetracaine. Once adequate anesthesia was optained, small plastic eye shields were inserted and remained in place until the procedure was completed.

## 2024-10-18 NOTE — PROCEDURE: PRO-NOX
Total Time (Leave Blank If Not Wanted): 40 mins
Pre-Procedure Text: The patient was connected to the Pro-Nox machine and positioned appropriately for the anticipated procedure. Following the procedure they were disconnected and monitored for any lasting side effects prior to leaving the office.
Consent obtained, risks reviewed.
Post-Care Instructions: Post care reviewed. Ice pack provided.
Detail Level: Zone

## 2024-10-30 ENCOUNTER — RX ONLY (OUTPATIENT)
Age: 64
Setting detail: RX ONLY
End: 2024-10-30

## 2024-10-30 ENCOUNTER — APPOINTMENT (RX ONLY)
Dept: URBAN - METROPOLITAN AREA CLINIC 55 | Facility: CLINIC | Age: 64
Setting detail: DERMATOLOGY
End: 2024-10-30

## 2024-10-30 DIAGNOSIS — Z41.9 ENCOUNTER FOR PROCEDURE FOR PURPOSES OTHER THAN REMEDYING HEALTH STATE, UNSPECIFIED: ICD-10-CM

## 2024-10-30 PROCEDURE — ? IN-HOUSE DISPENSING PHARMACY

## 2024-10-30 PROCEDURE — ? INVENTORY

## 2024-10-30 RX ORDER — ALPRAZOLAM 1 MG/1
TABLET ORAL
Qty: 2 | Refills: 0 | Status: ERX

## 2024-10-30 RX ORDER — HYDROCODONE BITARTRATE AND ACETAMINOPHEN 5; 325 MG/1; MG/1
TABLET ORAL
Qty: 4 | Refills: 0 | Status: ERX

## 2024-10-30 NOTE — PROCEDURE: IN-HOUSE DISPENSING PHARMACY
Product 21 Amount/Unit (Numbers Only): 0
Product 17 Unit Type: mg
Name Of Product 1: Anti-Aging Brightening Cream
Send Charges To Patient Encounter: No
Render Product Pricing In Note: Yes
Product 6 Application Directions: Apply nightly or as directed. Use SPF daily.
Product 5 Amount/Unit (Numbers Only): 30
Product 2 Refills: 11
Product 2 Unit Type: ml
Name Of Product 5: Hydrating Tretinoin 0.025% Cream
Product 4 Units Dispensed: 1
Name Of Product 2: Dapsone / Spironolactone Gel
Product 7 Refills: 2
Product 7 Application Directions: Apply only as directed
Detail Level: Zone
Product 4 Application Directions: Apply nightly or as directed.
Name Of Product 6: Rosacea Triple Combination Gel
Name Of Product 3: Acne Triple Combination Gel
Name Of Product 4: Hydroquinone 8% Emulsion
Name Of Product 7: Lidocaine 23% / Tetracaine 7% Cream

## 2024-10-31 ENCOUNTER — RX ONLY (OUTPATIENT)
Age: 64
Setting detail: RX ONLY
End: 2024-10-31

## 2024-11-01 ENCOUNTER — RX ONLY (OUTPATIENT)
Age: 64
Setting detail: RX ONLY
End: 2024-11-01

## 2024-11-01 RX ORDER — VALACYCLOVIR 500 MG/1
TABLET, FILM COATED ORAL
Qty: 10 | Refills: 2 | Status: ERX

## 2024-11-11 ENCOUNTER — APPOINTMENT (RX ONLY)
Dept: URBAN - METROPOLITAN AREA CLINIC 55 | Facility: CLINIC | Age: 64
Setting detail: DERMATOLOGY
End: 2024-11-11

## 2024-11-11 DIAGNOSIS — Z41.9 ENCOUNTER FOR PROCEDURE FOR PURPOSES OTHER THAN REMEDYING HEALTH STATE, UNSPECIFIED: ICD-10-CM

## 2024-11-11 PROCEDURE — ? PRO-NOX

## 2024-11-11 PROCEDURE — ? FRAXEL

## 2024-11-11 PROCEDURE — ? INVENTORY

## 2024-11-11 NOTE — PROCEDURE: FRAXEL
Total Coverage: 35%
Total Coverage: 30%
Post-Care Instructions: Topical anesthetic removed with gauze. Skin cleansed with a gentle  and prepped with Hibiclens.\\nCO2 lift mask applied post treatment and advised to keep on for 1 hour. Ice packs sent home with patient. \\nI reviewed with the patient in detail post-care instructions. Patient should avoid sun until area fully healed.
Location: decolletage of the chest
Treatment Level: 1
Location: neck
External Cooling Fan Speed: 5
Treatment Number: 2
Detail Level: Zone
Medium Metal Eye Shield Text: The ocular mucosa was anesthetized with tetracaine. Once adequate anesthesia was optained, medium metal eye shields were inserted and remained in place until the procedure was completed.
Large Plastic Eye Shield Text: The ocular mucosa was anesthetized with tetracaine. Once adequate anesthesia was optained, large plastic eye shields were inserted and remained in place until the procedure was completed.
Location: full face
Total Energy In Kj (Optional- Don't Include Units): 2.13
Total Energy In Kj (Optional- Don't Include Units): 1.83
Topical Anesthesia Type: 20% benzocaine, 8% lidocaine, 4% tetracaine
Wavelength: 1927nm
Total Energy In Kj (Optional- Don't Include Units): 3.06
Energy(Mj/Cm2): 15
Anesthesia Type: 1% lidocaine with epinephrine
Large Metal Eye Shield Text: The ocular mucosa was anesthetized with tetracaine. Once adequate anesthesia was optained, large metal eye shields were inserted and remained in place until the procedure was completed.
Energy(Mj/Cm2): 20
Treatment Level: 3
Length Of Topical Anesthesia Application (Optional): 60 minutes
Consent obtained, risks reviewed.
Number Of Passes: 8
Number Of Passes: 4
Anesthesia Volume In Cc: 0.5
Small Plastic Eye Shield Text: The ocular mucosa was anesthetized with tetracaine. Once adequate anesthesia was optained, small plastic eye shields were inserted and remained in place until the procedure was completed.
Was An Eye Shield Used?: No
Add Post-Care Below To The Note: Yes
Depth In Microns (Use Numbers Only, No Special Characters Or $): 199
Indication: photodamage
Depth In Microns (Use Numbers Only, No Special Characters Or $): 202
Small Metal Eye Shield Text: The ocular mucosa was anesthetized with tetracaine. Once adequate anesthesia was optained, small metal eye shields were inserted and remained in place until the procedure was completed.
Medium Plastic Eye Shield Text: The ocular mucosa was anesthetized with tetracaine. Once adequate anesthesia was optained, medium plastic eye shields were inserted and remained in place until the procedure was completed.

## 2024-11-11 NOTE — PROCEDURE: PRO-NOX
Total Time (Leave Blank If Not Wanted): 60 mins
Detail Level: Zone
Pre-Procedure Text: The patient was connected to the Pro-Nox machine and positioned appropriately for the anticipated procedure. Following the procedure they were disconnected and monitored for any lasting side effects prior to leaving the office.
Post-Care Instructions: Post care reviewed. Ice pack provided.
Consent obtained, risks reviewed.

## 2024-11-19 ENCOUNTER — APPOINTMENT (OUTPATIENT)
Age: 64
Setting detail: DERMATOLOGY
End: 2024-11-19

## 2024-11-19 DIAGNOSIS — B00.1 HERPESVIRAL VESICULAR DERMATITIS: ICD-10-CM

## 2024-11-19 DIAGNOSIS — D22 MELANOCYTIC NEVI: ICD-10-CM

## 2024-11-19 DIAGNOSIS — L81.4 OTHER MELANIN HYPERPIGMENTATION: ICD-10-CM

## 2024-11-19 DIAGNOSIS — L82.1 OTHER SEBORRHEIC KERATOSIS: ICD-10-CM

## 2024-11-19 DIAGNOSIS — L73.9 FOLLICULAR DISORDER, UNSPECIFIED: ICD-10-CM

## 2024-11-19 PROBLEM — D48.5 NEOPLASM OF UNCERTAIN BEHAVIOR OF SKIN: Status: ACTIVE | Noted: 2024-11-19

## 2024-11-19 PROBLEM — D22.61 MELANOCYTIC NEVI OF RIGHT UPPER LIMB, INCLUDING SHOULDER: Status: ACTIVE | Noted: 2024-11-19

## 2024-11-19 PROBLEM — D22.71 MELANOCYTIC NEVI OF RIGHT LOWER LIMB, INCLUDING HIP: Status: ACTIVE | Noted: 2024-11-19

## 2024-11-19 PROBLEM — D22.62 MELANOCYTIC NEVI OF LEFT UPPER LIMB, INCLUDING SHOULDER: Status: ACTIVE | Noted: 2024-11-19

## 2024-11-19 PROBLEM — D22.5 MELANOCYTIC NEVI OF TRUNK: Status: ACTIVE | Noted: 2024-11-19

## 2024-11-19 PROBLEM — L02.821 FURUNCLE OF HEAD [ANY PART, EXCEPT FACE]: Status: ACTIVE | Noted: 2024-11-19

## 2024-11-19 PROBLEM — D22.39 MELANOCYTIC NEVI OF OTHER PARTS OF FACE: Status: ACTIVE | Noted: 2024-11-19

## 2024-11-19 PROBLEM — D22.72 MELANOCYTIC NEVI OF LEFT LOWER LIMB, INCLUDING HIP: Status: ACTIVE | Noted: 2024-11-19

## 2024-11-19 PROCEDURE — ? OBSERVATION

## 2024-11-19 PROCEDURE — ? COUNSELING

## 2024-11-19 PROCEDURE — ? PHOTO-DOCUMENTATION

## 2024-11-19 PROCEDURE — ? PRESCRIPTION MEDICATION MANAGEMENT

## 2024-11-19 PROCEDURE — ? BIOPSY BY SHAVE METHOD

## 2024-11-19 PROCEDURE — ? PRESCRIPTION

## 2024-11-19 PROCEDURE — ? SUNSCREEN RECOMMENDATIONS

## 2024-11-19 PROCEDURE — ? LIQUID NITROGEN (COSMETIC)

## 2024-11-19 RX ORDER — CLINDAMYCIN PHOSPHATE 10 MG/ML
SOLUTION TOPICAL
Qty: 60 | Refills: 11 | Status: ERX

## 2024-11-19 RX ORDER — VALACYCLOVIR 1 G/1
TABLET, FILM COATED ORAL
Qty: 24 | Refills: 2 | Status: ERX | COMMUNITY
Start: 2024-11-19

## 2024-11-19 RX ADMIN — VALACYCLOVIR: 1 TABLET, FILM COATED ORAL at 00:00

## 2024-11-19 ASSESSMENT — LOCATION SIMPLE DESCRIPTION DERM
LOCATION SIMPLE: LEFT AXILLA
LOCATION SIMPLE: RIGHT FOREARM
LOCATION SIMPLE: RIGHT UPPER ARM
LOCATION SIMPLE: RIGHT BUTTOCK
LOCATION SIMPLE: RIGHT ANTERIOR NECK
LOCATION SIMPLE: LEFT UPPER BACK
LOCATION SIMPLE: RIGHT CHEEK
LOCATION SIMPLE: TRAPEZIAL NECK
LOCATION SIMPLE: LOWER BACK
LOCATION SIMPLE: LEFT POSTERIOR UPPER ARM
LOCATION SIMPLE: RIGHT THIGH
LOCATION SIMPLE: UPPER BACK
LOCATION SIMPLE: RIGHT UPPER BACK
LOCATION SIMPLE: LEFT AXILLARY VAULT
LOCATION SIMPLE: LEFT CHEEK
LOCATION SIMPLE: LEFT THIGH
LOCATION SIMPLE: ABDOMEN
LOCATION SIMPLE: CHEST
LOCATION SIMPLE: RIGHT POSTERIOR UPPER ARM
LOCATION SIMPLE: RIGHT POSTERIOR THIGH
LOCATION SIMPLE: LEFT CALF
LOCATION SIMPLE: LEFT CLAVICULAR SKIN
LOCATION SIMPLE: LEFT SCALP
LOCATION SIMPLE: LEFT LIP
LOCATION SIMPLE: LEFT SHOULDER
LOCATION SIMPLE: RIGHT POPLITEAL SKIN
LOCATION SIMPLE: LEFT LOWER BACK
LOCATION SIMPLE: LEFT ANTERIOR NECK
LOCATION SIMPLE: RIGHT SHOULDER

## 2024-11-19 ASSESSMENT — LOCATION ZONE DERM
LOCATION ZONE: FACE
LOCATION ZONE: TRUNK
LOCATION ZONE: LIP
LOCATION ZONE: ARM
LOCATION ZONE: LEG
LOCATION ZONE: SCALP
LOCATION ZONE: AXILLAE
LOCATION ZONE: NECK

## 2024-11-19 ASSESSMENT — LOCATION DETAILED DESCRIPTION DERM
LOCATION DETAILED: LEFT AXILLARY VAULT
LOCATION DETAILED: RIGHT POPLITEAL SKIN
LOCATION DETAILED: LEFT ANTERIOR SHOULDER
LOCATION DETAILED: RIGHT PROXIMAL POSTERIOR UPPER ARM
LOCATION DETAILED: LEFT LATERAL SUPERIOR CHEST
LOCATION DETAILED: RIGHT PROXIMAL POSTERIOR THIGH
LOCATION DETAILED: LEFT SUPERIOR MEDIAL UPPER BACK
LOCATION DETAILED: RIGHT INFERIOR MEDIAL UPPER BACK
LOCATION DETAILED: LEFT SUPERIOR CENTRAL MALAR CHEEK
LOCATION DETAILED: LEFT INFERIOR VERMILION LIP
LOCATION DETAILED: RIGHT SUPERIOR MEDIAL UPPER BACK
LOCATION DETAILED: LEFT PROXIMAL POSTERIOR UPPER ARM
LOCATION DETAILED: RIGHT MEDIAL BUTTOCK
LOCATION DETAILED: LEFT CENTRAL MALAR CHEEK
LOCATION DETAILED: RIGHT ANTERIOR SHOULDER
LOCATION DETAILED: RIGHT INFERIOR ANTERIOR NECK
LOCATION DETAILED: INFERIOR LUMBAR SPINE
LOCATION DETAILED: PERIUMBILICAL SKIN
LOCATION DETAILED: LEFT MEDIAL MALAR CHEEK
LOCATION DETAILED: LEFT CLAVICULAR SKIN
LOCATION DETAILED: LEFT ANTERIOR PROXIMAL THIGH
LOCATION DETAILED: RIGHT ANTERIOR LATERAL DISTAL UPPER ARM
LOCATION DETAILED: RIGHT DISTAL POSTERIOR THIGH
LOCATION DETAILED: RIGHT PROXIMAL DORSAL FOREARM
LOCATION DETAILED: SUPERIOR THORACIC SPINE
LOCATION DETAILED: LEFT ANTERIOR LATERAL PROXIMAL THIGH
LOCATION DETAILED: MID TRAPEZIAL NECK
LOCATION DETAILED: LEFT MEDIAL FRONTAL SCALP
LOCATION DETAILED: LEFT INFERIOR ANTERIOR NECK
LOCATION DETAILED: RIGHT INFERIOR LATERAL NECK
LOCATION DETAILED: RIGHT INFERIOR LATERAL MALAR CHEEK
LOCATION DETAILED: LEFT SUPERIOR MEDIAL MIDBACK
LOCATION DETAILED: LEFT PROXIMAL CALF
LOCATION DETAILED: RIGHT SUPERIOR CENTRAL BUCCAL CHEEK
LOCATION DETAILED: LEFT ANTERIOR DISTAL THIGH
LOCATION DETAILED: LEFT INFERIOR LATERAL NECK
LOCATION DETAILED: RIGHT BUTTOCK
LOCATION DETAILED: EPIGASTRIC SKIN
LOCATION DETAILED: LEFT ANTERIOR AXILLA
LOCATION DETAILED: LEFT PROXIMAL LATERAL CALF
LOCATION DETAILED: RIGHT CLAVICULAR NECK
LOCATION DETAILED: RIGHT SUPERIOR UPPER BACK
LOCATION DETAILED: RIGHT ANTERIOR PROXIMAL THIGH
LOCATION DETAILED: RIGHT LATERAL SUPERIOR CHEST

## 2024-11-21 ENCOUNTER — APPOINTMENT (RX ONLY)
Dept: URBAN - METROPOLITAN AREA CLINIC 55 | Facility: CLINIC | Age: 64
Setting detail: DERMATOLOGY
End: 2024-11-21

## 2024-11-21 DIAGNOSIS — Z41.9 ENCOUNTER FOR PROCEDURE FOR PURPOSES OTHER THAN REMEDYING HEALTH STATE, UNSPECIFIED: ICD-10-CM

## 2024-11-21 PROCEDURE — ? BOTOX

## 2024-11-21 NOTE — PROCEDURE: BOTOX
Lcl Root Units: 0
Show Orbicularis Oculi Units: Yes
Forehead Units: 4
Show Mentalis Units: No
Glabellar Complex Units: 12
Show Inventory Tab: Show
Consent was obtained.
Post-Care Instructions: Patient instructed to not lie down for 4 hours and limit physical activity for 24 hours.
Periorbital Skin Units: 16
Detail Level: Detailed
Incrementing Botox Units: By 0.5 Units
Depressor Anguli Oris Units: 6

## 2024-12-13 ENCOUNTER — APPOINTMENT (OUTPATIENT)
Dept: URBAN - METROPOLITAN AREA CLINIC 55 | Facility: CLINIC | Age: 64
Setting detail: DERMATOLOGY
End: 2024-12-13

## 2024-12-13 DIAGNOSIS — Z41.9 ENCOUNTER FOR PROCEDURE FOR PURPOSES OTHER THAN REMEDYING HEALTH STATE, UNSPECIFIED: ICD-10-CM

## 2024-12-13 PROCEDURE — ? PRO-NOX

## 2024-12-13 PROCEDURE — ? FRAXEL

## 2024-12-13 PROCEDURE — ? INVENTORY

## 2024-12-13 NOTE — PROCEDURE: FRAXEL
Indication: resurfacing
Medium Plastic Eye Shield Text: The ocular mucosa was anesthetized with tetracaine. Once adequate anesthesia was optained, medium plastic eye shields were inserted and remained in place until the procedure was completed.
Energy(Mj/Cm2): 40
Number Of Passes: 4
External Cooling Fan Speed: 5
Small Metal Eye Shield Text: The ocular mucosa was anesthetized with tetracaine. Once adequate anesthesia was optained, small metal eye shields were inserted and remained in place until the procedure was completed.
Total Energy In Kj (Optional- Don't Include Units): 2.67
Total Coverage: 9%
Treatment Level: 1
Wavelength: 1550nm
Depth In Microns (Use Numbers Only, No Special Characters Or $): 199
Large Plastic Eye Shield Text: The ocular mucosa was anesthetized with tetracaine. Once adequate anesthesia was optained, large plastic eye shields were inserted and remained in place until the procedure was completed.
Energy(Mj/Cm2): 20
Medium Metal Eye Shield Text: The ocular mucosa was anesthetized with tetracaine. Once adequate anesthesia was optained, medium metal eye shields were inserted and remained in place until the procedure was completed.
Treatment Number: 3
Total Energy In Kj (Optional- Don't Include Units): 5.04
Total Coverage: 30%
Wavelength: 1927nm
Topical Anesthesia Type: 20% benzocaine, 8% lidocaine, 4% tetracaine
Location: neck
Depth In Microns (Use Numbers Only, No Special Characters Or $): 9082
Anesthesia Type: 1% lidocaine with epinephrine
Location: decolletage of the chest
Add Post-Care Below To The Note: Yes
Large Metal Eye Shield Text: The ocular mucosa was anesthetized with tetracaine. Once adequate anesthesia was optained, large metal eye shields were inserted and remained in place until the procedure was completed.
Total Coverage: 11%
Location: perioral area
Depth In Microns (Use Numbers Only, No Special Characters Or $): 202
Detail Level: Zone
Post-Care Instructions: Topical anesthetic removed with gauze. Skin cleansed with a gentle  and prepped with Hibiclens.\\nCO2 lift mask applied post treatment and advised to keep on for 1 hour. Ice packs sent home with patient. \\nI reviewed with the patient in detail post-care instructions. Patient should avoid sun until area fully healed.
Anesthesia Volume In Cc: 0.5
Length Of Topical Anesthesia Application (Optional): 60 minutes
Small Plastic Eye Shield Text: The ocular mucosa was anesthetized with tetracaine. Once adequate anesthesia was optained, small plastic eye shields were inserted and remained in place until the procedure was completed.
Energy(Mj/Cm2): 15
Total Energy In Kj (Optional- Don't Include Units): 0.83
Consent obtained, risks reviewed.
Was An Eye Shield Used?: No
Depth In Microns (Use Numbers Only, No Special Characters Or $): 596

## 2024-12-13 NOTE — PROCEDURE: PRO-NOX
Consent obtained, risks reviewed.
Pre-Procedure Text: The patient was connected to the Pro-Nox machine and positioned appropriately for the anticipated procedure. Following the procedure they were disconnected and monitored for any lasting side effects prior to leaving the office.
Detail Level: Zone
Total Time (Leave Blank If Not Wanted): 35 mins
Post-Care Instructions: Post care reviewed. Ice pack provided.

## 2024-12-23 ENCOUNTER — RX ONLY (RX ONLY)
Age: 64
End: 2024-12-23

## 2024-12-23 ENCOUNTER — APPOINTMENT (OUTPATIENT)
Dept: URBAN - METROPOLITAN AREA CLINIC 55 | Facility: CLINIC | Age: 64
Setting detail: DERMATOLOGY
End: 2024-12-23

## 2024-12-23 DIAGNOSIS — Z41.9 ENCOUNTER FOR PROCEDURE FOR PURPOSES OTHER THAN REMEDYING HEALTH STATE, UNSPECIFIED: ICD-10-CM

## 2024-12-23 PROCEDURE — ? FRAXEL

## 2024-12-23 PROCEDURE — ? INVENTORY

## 2024-12-23 PROCEDURE — ? PRO-NOX

## 2024-12-23 RX ORDER — ALPRAZOLAM 1 MG/1
TABLET ORAL
Qty: 1 | Refills: 0 | Status: ERX

## 2024-12-23 RX ORDER — HYDROCODONE BITARTRATE AND ACETAMINOPHEN 5; 325 MG/1; MG/1
TABLET ORAL
Qty: 2 | Refills: 0 | Status: ERX

## 2024-12-23 NOTE — PROCEDURE: PRO-NOX
Consent obtained, risks reviewed.
Post-Care Instructions: Post care reviewed. Ice pack provided.
Detail Level: Zone
Total Time (Leave Blank If Not Wanted): 35 mins
Pre-Procedure Text: The patient was connected to the Pro-Nox machine and positioned appropriately for the anticipated procedure. Following the procedure they were disconnected and monitored for any lasting side effects prior to leaving the office.

## 2024-12-23 NOTE — PROCEDURE: FRAXEL
Wavelength: 1550nm
Treatment Level: 1
Treatment Level: 3
Medium Metal Eye Shield Text: The ocular mucosa was anesthetized with tetracaine. Once adequate anesthesia was optained, medium metal eye shields were inserted and remained in place until the procedure was completed.
Depth In Microns (Use Numbers Only, No Special Characters Or $): 993
Location: neck
Large Plastic Eye Shield Text: The ocular mucosa was anesthetized with tetracaine. Once adequate anesthesia was optained, large plastic eye shields were inserted and remained in place until the procedure was completed.
Add Post-Care Below To The Note: Yes
Length Of Topical Anesthesia Application (Optional): 60 minutes
Total Energy In Kj (Optional- Don't Include Units): 1.34
Number Of Passes: 4
Energy(Mj/Cm2): 40
Consent obtained, risks reviewed.
Total Coverage: 30%
Anesthesia Volume In Cc: 0.5
Indication: photodamage
Depth In Microns (Use Numbers Only, No Special Characters Or $): 199
Medium Plastic Eye Shield Text: The ocular mucosa was anesthetized with tetracaine. Once adequate anesthesia was optained, medium plastic eye shields were inserted and remained in place until the procedure was completed.
Small Metal Eye Shield Text: The ocular mucosa was anesthetized with tetracaine. Once adequate anesthesia was optained, small metal eye shields were inserted and remained in place until the procedure was completed.
Total Energy In Kj (Optional- Don't Include Units): 0.51
Wavelength: 1927nm
Anesthesia Type: 1% lidocaine with epinephrine
Energy(Mj/Cm2): 15
Depth In Microns (Use Numbers Only, No Special Characters Or $): 202
Location: perioral area
Topical Anesthesia Type: 20% benzocaine, 8% lidocaine, 4% tetracaine
Was An Eye Shield Used?: No
Detail Level: Zone
Number Of Passes: 8
Total Coverage: 9%
Small Plastic Eye Shield Text: The ocular mucosa was anesthetized with tetracaine. Once adequate anesthesia was optained, small plastic eye shields were inserted and remained in place until the procedure was completed.
External Cooling Fan Speed: 5
Post-Care Instructions: Topical anesthetic removed with gauze. Skin cleansed with a gentle  and prepped with Hibiclens.\\nCO2 lift mask applied post treatment and advised to keep on for 1 hour. Ice packs sent home with patient. \\nI reviewed with the patient in detail post-care instructions. Patient should avoid sun until area fully healed.
Large Metal Eye Shield Text: The ocular mucosa was anesthetized with tetracaine. Once adequate anesthesia was optained, large metal eye shields were inserted and remained in place until the procedure was completed.
Energy(Mj/Cm2): 20

## 2025-02-25 ENCOUNTER — APPOINTMENT (OUTPATIENT)
Age: 65
Setting detail: DERMATOLOGY
End: 2025-02-25

## 2025-02-25 DIAGNOSIS — L82.1 OTHER SEBORRHEIC KERATOSIS: ICD-10-CM

## 2025-02-25 DIAGNOSIS — B00.1 HERPESVIRAL VESICULAR DERMATITIS: ICD-10-CM

## 2025-02-25 PROCEDURE — ? INVENTORY

## 2025-02-25 PROCEDURE — ? ADDITIONAL NOTES

## 2025-02-25 PROCEDURE — ? PRESCRIPTION

## 2025-02-25 PROCEDURE — ? LIQUID NITROGEN (COSMETIC)

## 2025-02-25 PROCEDURE — ? COUNSELING

## 2025-02-25 RX ORDER — VALACYCLOVIR 500 MG/1
TABLET, FILM COATED ORAL
Qty: 90 | Refills: 3 | Status: ERX | COMMUNITY
Start: 2025-02-25

## 2025-02-25 RX ADMIN — VALACYCLOVIR: 500 TABLET, FILM COATED ORAL at 00:00

## 2025-02-25 ASSESSMENT — LOCATION DETAILED DESCRIPTION DERM
LOCATION DETAILED: LEFT INFERIOR CENTRAL MALAR CHEEK
LOCATION DETAILED: UPPER STERNUM
LOCATION DETAILED: RIGHT CENTRAL ANTERIOR NECK
LOCATION DETAILED: LEFT CENTRAL MALAR CHEEK
LOCATION DETAILED: LEFT INFERIOR VERMILION LIP
LOCATION DETAILED: RIGHT LATERAL SUPERIOR CHEST
LOCATION DETAILED: LEFT INFERIOR ANTERIOR NECK
LOCATION DETAILED: RIGHT CLAVICULAR NECK
LOCATION DETAILED: RIGHT LATERAL BUCCAL CHEEK
LOCATION DETAILED: LEFT LATERAL SUPERIOR CHEST

## 2025-02-25 ASSESSMENT — LOCATION ZONE DERM
LOCATION ZONE: FACE
LOCATION ZONE: LIP
LOCATION ZONE: TRUNK
LOCATION ZONE: NECK

## 2025-02-25 ASSESSMENT — LOCATION SIMPLE DESCRIPTION DERM
LOCATION SIMPLE: RIGHT CHEEK
LOCATION SIMPLE: CHEST
LOCATION SIMPLE: LEFT CHEEK
LOCATION SIMPLE: LEFT ANTERIOR NECK
LOCATION SIMPLE: LEFT LIP
LOCATION SIMPLE: RIGHT ANTERIOR NECK
LOCATION SIMPLE: NECK

## 2025-04-21 ENCOUNTER — APPOINTMENT (OUTPATIENT)
Age: 65
Setting detail: DERMATOLOGY
End: 2025-04-21

## 2025-04-21 DIAGNOSIS — L82.0 INFLAMED SEBORRHEIC KERATOSIS: ICD-10-CM

## 2025-04-21 PROBLEM — D48.5 NEOPLASM OF UNCERTAIN BEHAVIOR OF SKIN: Status: ACTIVE | Noted: 2025-04-21

## 2025-04-21 PROCEDURE — ? BIOPSY BY SHAVE METHOD

## 2025-04-21 ASSESSMENT — LOCATION SIMPLE DESCRIPTION DERM: LOCATION SIMPLE: LEFT BREAST

## 2025-04-21 ASSESSMENT — LOCATION DETAILED DESCRIPTION DERM: LOCATION DETAILED: LEFT MEDIAL BREAST 10-11:00 REGION

## 2025-04-21 ASSESSMENT — LOCATION ZONE DERM: LOCATION ZONE: TRUNK

## 2025-08-27 ENCOUNTER — APPOINTMENT (OUTPATIENT)
Dept: URBAN - METROPOLITAN AREA CLINIC 55 | Facility: CLINIC | Age: 65
Setting detail: DERMATOLOGY
End: 2025-08-27

## 2025-08-27 DIAGNOSIS — Z41.9 ENCOUNTER FOR PROCEDURE FOR PURPOSES OTHER THAN REMEDYING HEALTH STATE, UNSPECIFIED: ICD-10-CM

## 2025-08-27 PROCEDURE — ? BOTOX
